# Patient Record
Sex: FEMALE | Race: WHITE | Employment: FULL TIME | URBAN - METROPOLITAN AREA
[De-identification: names, ages, dates, MRNs, and addresses within clinical notes are randomized per-mention and may not be internally consistent; named-entity substitution may affect disease eponyms.]

---

## 2020-07-01 ENCOUNTER — VIRTUAL VISIT (OUTPATIENT)
Dept: PEDIATRICS | Facility: CLINIC | Age: 40
End: 2020-07-01
Payer: COMMERCIAL

## 2020-07-01 DIAGNOSIS — F41.9 ANXIETY: Primary | ICD-10-CM

## 2020-07-01 PROCEDURE — 99203 OFFICE O/P NEW LOW 30 MIN: CPT | Mod: 95 | Performed by: INTERNAL MEDICINE

## 2020-07-01 PROCEDURE — 96127 BRIEF EMOTIONAL/BEHAV ASSMT: CPT | Performed by: INTERNAL MEDICINE

## 2020-07-01 RX ORDER — ESCITALOPRAM OXALATE 20 MG/1
20 TABLET ORAL DAILY
Qty: 30 TABLET | Refills: 1 | Status: SHIPPED | OUTPATIENT
Start: 2020-07-01 | End: 2020-08-24

## 2020-07-01 ASSESSMENT — ENCOUNTER SYMPTOMS
FATIGUE: 1
HEADACHES: 1
NERVOUS/ANXIOUS: 1
ABDOMINAL PAIN: 1
VERTIGO: 1

## 2020-07-01 ASSESSMENT — ANXIETY QUESTIONNAIRES
1. FEELING NERVOUS, ANXIOUS, OR ON EDGE: NEARLY EVERY DAY
6. BECOMING EASILY ANNOYED OR IRRITABLE: NEARLY EVERY DAY
7. FEELING AFRAID AS IF SOMETHING AWFUL MIGHT HAPPEN: SEVERAL DAYS
2. NOT BEING ABLE TO STOP OR CONTROL WORRYING: NEARLY EVERY DAY
GAD7 TOTAL SCORE: 16
5. BEING SO RESTLESS THAT IT IS HARD TO SIT STILL: NOT AT ALL
3. WORRYING TOO MUCH ABOUT DIFFERENT THINGS: NEARLY EVERY DAY
IF YOU CHECKED OFF ANY PROBLEMS ON THIS QUESTIONNAIRE, HOW DIFFICULT HAVE THESE PROBLEMS MADE IT FOR YOU TO DO YOUR WORK, TAKE CARE OF THINGS AT HOME, OR GET ALONG WITH OTHER PEOPLE: SOMEWHAT DIFFICULT

## 2020-07-01 ASSESSMENT — PATIENT HEALTH QUESTIONNAIRE - PHQ9
5. POOR APPETITE OR OVEREATING: NEARLY EVERY DAY
SUM OF ALL RESPONSES TO PHQ QUESTIONS 1-9: 16

## 2020-07-01 NOTE — PROGRESS NOTES
"Subjective Melissa Schwab is a 40 year old female who is being evaluated via a billable telephone visit.      The patient has been notified of following:     \"This telephone visit will be conducted via a call between you and your physician/provider. We have found that certain health care needs can be provided without the need for a physical exam.  This service lets us provide the care you need with a short phone conversation.  If a prescription is necessary we can send it directly to your pharmacy.  If lab work is needed we can place an order for that and you can then stop by our lab to have the test done at a later time.    Telephone visits are billed at different rates depending on your insurance coverage. During this emergency period, for some insurers they may be billed the same as an in-person visit.  Please reach out to your insurance provider with any questions.    If during the course of the call the physician/provider feels a telephone visit is not appropriate, you will not be charged for this service.\"    Patient has given verbal consent for Telephone visit?  Yes    What phone number would you like to be contacted at? [phone]    How would you like to obtain your AVS? Sabine    Subjective Melissa Schwab is a 40 year old female who presents via phone visit today for the following health issues:        Anxiety   This is a chronic problem. The current episode started more than 1 year ago (two years now). The problem occurs constantly. The problem has been gradually worsening. Associated symptoms include abdominal pain, fatigue, headaches and vertigo. Associated symptoms comments: Sleep or lack there  of. The symptoms are aggravated by drinking, exertion and stress. She has tried rest and relaxation (exercising) for the symptoms. The treatment provided no relief.      Was taking a medication to mitigate anxiety symptoms.  Rx ran out in January and stopped taking.    Anxiety is now worsening. "     Lexapro 20 mg per day was her dose.  No side effects were noted at 20 mg.    ALEXANDRA-7 SCORE 7/1/2020   Total Score 16     She would like to restart Lexapro  Discussed options as far as dosing      Review of Systems   Constitutional: Positive for fatigue.   Gastrointestinal: Positive for abdominal pain.   Neurological: Positive for vertigo and headaches.   Psychiatric/Behavioral: The patient is nervous/anxious.           Objective    There were no vitals taken for this visit.  There is no height or weight on file to calculate BMI.  Physical Exam   GEN: No distress  PSYCH: Alert, affect is normal  RESP: No cough, no audible wheezing, able to talk in full sentences  Remainder of exam unable to be completed due to telephone visits               Assessment/Plan:  1. Anxiety  Not currently controlled  - escitalopram (LEXAPRO) 20 MG tablet; Take 1 tablet (20 mg) by mouth daily  Dispense: 30 tablet; Refill: 1  Restart Lexapro  Rx for 20 mg tablets  Take 1/2 tablet daily for at least 1 week, can increase to full tablet if needed  Follow-up in 1-2 months for an update, sooner if sx worsen      Phone call duration:  6 minutes    Bobby Bermudez MD

## 2020-07-01 NOTE — PROGRESS NOTES
"Subjective     Melissa Schwab is a 40 year old female who presents to clinic today for the following health issues:    HPI   Depression and Anxiety Follow-Up    How are you doing with your depression since your last visit? { :637944::\"No change\"}    How are you doing with your anxiety since your last visit?  { :745860::\"No change\"}    Are you having other symptoms that might be associated with depression or anxiety? { :495115}    Have you had a significant life event? { :952835}     Do you have any concerns with your use of alcohol or other drugs? { :031925}    Social History     Tobacco Use     Smoking status: Not on file   Substance Use Topics     Alcohol use: Not on file     Drug use: Not on file     No flowsheet data found.  No flowsheet data found.  {Last PHQ9 or GAD7 Responses (Optional):989434}  {PROVIDER ONLY Complete follow-up questions for patients who report suicide ideation  (Optional):922762}    Suicide Assessment Five-step Evaluation and Treatment (SAFE-T)      How many servings of fruits and vegetables do you eat daily?  { :733353}    On average, how many sweetened beverages do you drink each day (Examples: soda, juice, sweet tea, etc.  Do NOT count diet or artificially sweetened beverages)?   { 1-11:894240}    How many days per week do you exercise enough to make your heart beat faster? { :088274}    How many minutes a day do you exercise enough to make your heart beat faster? { :733047}    How many days per week do you miss taking your medication? {0-7 :122468}    {additonal problems for provider to add (Optional):991920}    {HIST REVIEW/ LINKS 2 (Optional):468783}    {Additional problems for the provider to add (optional):688463}  Reviewed and updated as needed this visit by Provider         Review of Systems   {ROS COMP (Optional):455120}      Objective    There were no vitals taken for this visit.  There is no height or weight on file to calculate BMI.  Physical Exam   {Exam List " "(Optional):105500}    {Diagnostic Test Results (Optional):811753::\"Diagnostic Test Results:\",\"Labs reviewed in Epic\"}        {PROVIDER CHARTING PREFERENCE:726513}      "

## 2020-07-02 ASSESSMENT — ANXIETY QUESTIONNAIRES: GAD7 TOTAL SCORE: 16

## 2020-08-22 DIAGNOSIS — F41.9 ANXIETY: ICD-10-CM

## 2020-08-24 RX ORDER — ESCITALOPRAM OXALATE 20 MG/1
TABLET ORAL
Qty: 30 TABLET | Refills: 0 | Status: SHIPPED | OUTPATIENT
Start: 2020-08-24 | End: 2020-09-22

## 2020-08-24 NOTE — TELEPHONE ENCOUNTER
Prescription approved per [facility] Refill Protocol.    Angel Meneses RN

## 2020-10-06 ENCOUNTER — VIRTUAL VISIT (OUTPATIENT)
Dept: PEDIATRICS | Facility: CLINIC | Age: 40
End: 2020-10-06
Payer: COMMERCIAL

## 2020-10-06 DIAGNOSIS — F41.9 ANXIETY: ICD-10-CM

## 2020-10-06 PROCEDURE — 99213 OFFICE O/P EST LOW 20 MIN: CPT | Mod: 95 | Performed by: INTERNAL MEDICINE

## 2020-10-06 RX ORDER — ESCITALOPRAM OXALATE 20 MG/1
20 TABLET ORAL DAILY
Qty: 90 TABLET | Refills: 1 | Status: SHIPPED | OUTPATIENT
Start: 2020-10-06 | End: 2021-06-11

## 2020-10-06 ASSESSMENT — ANXIETY QUESTIONNAIRES
1. FEELING NERVOUS, ANXIOUS, OR ON EDGE: SEVERAL DAYS
2. NOT BEING ABLE TO STOP OR CONTROL WORRYING: SEVERAL DAYS
7. FEELING AFRAID AS IF SOMETHING AWFUL MIGHT HAPPEN: NOT AT ALL
6. BECOMING EASILY ANNOYED OR IRRITABLE: SEVERAL DAYS
5. BEING SO RESTLESS THAT IT IS HARD TO SIT STILL: SEVERAL DAYS
GAD7 TOTAL SCORE: 4
IF YOU CHECKED OFF ANY PROBLEMS ON THIS QUESTIONNAIRE, HOW DIFFICULT HAVE THESE PROBLEMS MADE IT FOR YOU TO DO YOUR WORK, TAKE CARE OF THINGS AT HOME, OR GET ALONG WITH OTHER PEOPLE: NOT DIFFICULT AT ALL
3. WORRYING TOO MUCH ABOUT DIFFERENT THINGS: NOT AT ALL

## 2020-10-06 ASSESSMENT — PATIENT HEALTH QUESTIONNAIRE - PHQ9: 5. POOR APPETITE OR OVEREATING: NOT AT ALL

## 2020-10-06 NOTE — PROGRESS NOTES
"Melissa Schwab is a 40 year old female who is being evaluated via a billable video visit.      The patient has been notified of following:     \"This video visit will be conducted via a call between you and your physician/provider. We have found that certain health care needs can be provided without the need for an in-person physical exam.  This service lets us provide the care you need with a video conversation.  If a prescription is necessary we can send it directly to your pharmacy.  If lab work is needed we can place an order for that and you can then stop by our lab to have the test done at a later time.    Video visits are billed at different rates depending on your insurance coverage.  Please reach out to your insurance provider with any questions.    If during the course of the call the physician/provider feels a video visit is not appropriate, you will not be charged for this service.\"    Patient has given verbal consent for Video visit? Yes  How would you like to obtain your AVS? MyChart  If you are dropped from the video visit, the video invite should be resent to: Text to cell phone: [phone]  Will anyone else be joining your video visit? No    Subjective     Melissa Schwab is a 40 year old female who presents today via video visit for the following health issues:    HPI    ALEXANDRA-7 SCORE 7/1/2020 10/6/2020   Total Score 16 4       Significant improvement w/ symptoms.  Feels the medication is working well.   No significant side effects noted.   Would like to CPM.     No other acute c/o today.       Video Start Time: 10:59 AM          Objective           Vitals:  No vitals were obtained today due to virtual visit.    Physical Exam     GEN: No distress  SKIN: No visible rashes  E: No eye drainage or icterus  ENT: No nasal discharge noted  LUNGS: No active cough, wheezing.   PSYCH: Normal affect. Well groomed.   NEURO: No focal deficits appreciated     Assessment & Plan       ICD-10-CM    1. Anxiety  F41.9 " escitalopram (LEXAPRO) 20 MG tablet     Sx are much better controlled w/ Lexapro.  CPM.          Return in about 6 months (around 4/6/2021) for Medication Recheck.    Bobby Bermudez MD  [facility]      Video-Visit Details    Type of service:  Video Visit    Video End Time:11:06 AM    Originating Location (pt. Location): Home    Distant Location (provider location):  [facility]     Platform used for Video Visit: Lois

## 2020-10-07 ASSESSMENT — ANXIETY QUESTIONNAIRES: GAD7 TOTAL SCORE: 4

## 2020-12-13 ENCOUNTER — HEALTH MAINTENANCE LETTER (OUTPATIENT)
Age: 40
End: 2020-12-13

## 2021-06-11 DIAGNOSIS — F41.9 ANXIETY: ICD-10-CM

## 2021-06-11 RX ORDER — ESCITALOPRAM OXALATE 20 MG/1
20 TABLET ORAL DAILY
Qty: 90 TABLET | Refills: 0 | Status: SHIPPED | OUTPATIENT
Start: 2021-06-11

## 2021-07-22 ENCOUNTER — HOSPITAL ENCOUNTER (OUTPATIENT)
Dept: CT IMAGING | Facility: CLINIC | Age: 41
Discharge: HOME | End: 2021-07-22
Attending: PHYSICIAN ASSISTANT | Admitting: PHYSICIAN ASSISTANT
Payer: COMMERCIAL

## 2021-07-22 ENCOUNTER — OFFICE VISIT (OUTPATIENT)
Dept: PEDIATRICS | Facility: CLINIC | Age: 41
End: 2021-07-22
Payer: COMMERCIAL

## 2021-07-22 ENCOUNTER — OFFICE VISIT (OUTPATIENT)
Dept: URGENT CARE | Facility: URGENT CARE | Age: 41
End: 2021-07-22
Payer: COMMERCIAL

## 2021-07-22 VITALS
WEIGHT: 156 LBS | DIASTOLIC BLOOD PRESSURE: 76 MMHG | TEMPERATURE: 97.9 F | SYSTOLIC BLOOD PRESSURE: 114 MMHG | HEART RATE: 75 BPM | BODY MASS INDEX: 26.99 KG/M2 | OXYGEN SATURATION: 99 % | RESPIRATION RATE: 18 BRPM

## 2021-07-22 VITALS
OXYGEN SATURATION: 98 % | DIASTOLIC BLOOD PRESSURE: 60 MMHG | HEART RATE: 78 BPM | RESPIRATION RATE: 20 BRPM | TEMPERATURE: 98 F | SYSTOLIC BLOOD PRESSURE: 107 MMHG

## 2021-07-22 DIAGNOSIS — R10.84 ABDOMINAL PAIN, GENERALIZED: Primary | ICD-10-CM

## 2021-07-22 DIAGNOSIS — R10.31 ABDOMINAL PAIN, RIGHT LOWER QUADRANT: Primary | ICD-10-CM

## 2021-07-22 DIAGNOSIS — R10.9 RIGHT SIDED ABDOMINAL PAIN: ICD-10-CM

## 2021-07-22 LAB
ALBUMIN SERPL-MCNC: 4.2 G/DL (ref 3.4–5)
ALBUMIN UR-MCNC: NEGATIVE MG/DL
ALP SERPL-CCNC: 43 U/L (ref 40–150)
ALT SERPL W P-5'-P-CCNC: 98 U/L (ref 0–50)
ANION GAP SERPL CALCULATED.3IONS-SCNC: 6 MMOL/L (ref 3–14)
APPEARANCE UR: CLEAR
AST SERPL W P-5'-P-CCNC: 69 U/L (ref 0–45)
BASOPHILS # BLD AUTO: 0 10E3/UL (ref 0–0.2)
BASOPHILS NFR BLD AUTO: 0 %
BILIRUB SERPL-MCNC: 1 MG/DL (ref 0.2–1.3)
BILIRUB UR QL STRIP: NEGATIVE
BUN SERPL-MCNC: 15 MG/DL (ref 7–30)
CALCIUM SERPL-MCNC: 9.2 MG/DL (ref 8.5–10.1)
CHLORIDE BLD-SCNC: 102 MMOL/L (ref 94–109)
CO2 SERPL-SCNC: 27 MMOL/L (ref 20–32)
COLOR UR AUTO: YELLOW
CREAT SERPL-MCNC: 0.65 MG/DL (ref 0.52–1.04)
EOSINOPHIL # BLD AUTO: 0 10E3/UL (ref 0–0.7)
EOSINOPHIL NFR BLD AUTO: 0 %
ERYTHROCYTE [DISTWIDTH] IN BLOOD BY AUTOMATED COUNT: 12.4 % (ref 10–15)
GFR SERPL CREATININE-BSD FRML MDRD: >90 ML/MIN/1.73M2
GLUCOSE BLD-MCNC: 92 MG/DL (ref 70–99)
GLUCOSE UR STRIP-MCNC: NEGATIVE MG/DL
HCG UR QL: NEGATIVE
HCT VFR BLD AUTO: 38.1 % (ref 35–47)
HGB BLD-MCNC: 12.6 G/DL (ref 11.7–15.7)
HGB UR QL STRIP: NEGATIVE
KETONES UR STRIP-MCNC: 40 MG/DL
LEUKOCYTE ESTERASE UR QL STRIP: NEGATIVE
LYMPHOCYTES # BLD AUTO: 0.9 10E3/UL (ref 0.8–5.3)
LYMPHOCYTES NFR BLD AUTO: 13 %
MCH RBC QN AUTO: 30.4 PG (ref 26.5–33)
MCHC RBC AUTO-ENTMCNC: 33.1 G/DL (ref 31.5–36.5)
MCV RBC AUTO: 92 FL (ref 78–100)
MONOCYTES # BLD AUTO: 0.3 10E3/UL (ref 0–1.3)
MONOCYTES NFR BLD AUTO: 4 %
NEUTROPHILS # BLD AUTO: 5.6 10E3/UL (ref 1.6–8.3)
NEUTROPHILS NFR BLD AUTO: 82 %
NITRATE UR QL: NEGATIVE
PH UR STRIP: 5.5 [PH] (ref 5–7)
PLATELET # BLD AUTO: 274 10E3/UL (ref 150–450)
POTASSIUM BLD-SCNC: 4 MMOL/L (ref 3.4–5.3)
PROT SERPL-MCNC: 7.5 G/DL (ref 6.8–8.8)
RBC # BLD AUTO: 4.14 10E6/UL (ref 3.8–5.2)
SODIUM SERPL-SCNC: 135 MMOL/L (ref 133–144)
SP GR UR STRIP: >=1.03 (ref 1–1.03)
UROBILINOGEN UR STRIP-ACNC: 0.2 E.U./DL
WBC # BLD AUTO: 6.8 10E3/UL (ref 4–11)

## 2021-07-22 PROCEDURE — 99417 PROLNG OP E/M EACH 15 MIN: CPT | Performed by: PHYSICIAN ASSISTANT

## 2021-07-22 PROCEDURE — 250N000011 HC RX IP 250 OP 636: Performed by: PHYSICIAN ASSISTANT

## 2021-07-22 PROCEDURE — 36415 COLL VENOUS BLD VENIPUNCTURE: CPT | Performed by: FAMILY MEDICINE

## 2021-07-22 PROCEDURE — 80053 COMPREHEN METABOLIC PANEL: CPT | Performed by: PHYSICIAN ASSISTANT

## 2021-07-22 PROCEDURE — 74177 CT ABD & PELVIS W/CONTRAST: CPT

## 2021-07-22 PROCEDURE — 81003 URINALYSIS AUTO W/O SCOPE: CPT | Performed by: FAMILY MEDICINE

## 2021-07-22 PROCEDURE — 250N000009 HC RX 250: Performed by: PHYSICIAN ASSISTANT

## 2021-07-22 PROCEDURE — 85025 COMPLETE CBC W/AUTO DIFF WBC: CPT | Performed by: FAMILY MEDICINE

## 2021-07-22 PROCEDURE — 81025 URINE PREGNANCY TEST: CPT | Performed by: FAMILY MEDICINE

## 2021-07-22 PROCEDURE — 99215 OFFICE O/P EST HI 40 MIN: CPT | Performed by: PHYSICIAN ASSISTANT

## 2021-07-22 PROCEDURE — 99207 REFERRAL TO ACUTE AND DIAGNOSTIC SERVICES: CPT | Performed by: FAMILY MEDICINE

## 2021-07-22 RX ORDER — DICYCLOMINE HCL 20 MG
20 TABLET ORAL EVERY 6 HOURS
Qty: 20 TABLET | Refills: 0 | Status: SHIPPED | OUTPATIENT
Start: 2021-07-22

## 2021-07-22 RX ORDER — NAPROXEN 500 MG/1
500 TABLET ORAL 2 TIMES DAILY WITH MEALS
Qty: 20 TABLET | Refills: 0 | Status: SHIPPED | OUTPATIENT
Start: 2021-07-22

## 2021-07-22 RX ORDER — IOPAMIDOL 755 MG/ML
500 INJECTION, SOLUTION INTRAVASCULAR ONCE
Status: COMPLETED | OUTPATIENT
Start: 2021-07-22 | End: 2021-07-22

## 2021-07-22 RX ADMIN — SODIUM CHLORIDE 60 ML: 9 INJECTION, SOLUTION INTRAVENOUS at 16:00

## 2021-07-22 RX ADMIN — IOPAMIDOL 81 ML: 755 INJECTION, SOLUTION INTRAVENOUS at 16:00

## 2021-07-22 NOTE — PROGRESS NOTES
Assessment & Plan     Abdominal pain, right lower quadrant  CBC neg for significant infection  CMP negative for renal or hepatic involvement  - IV access  - sodium chloride (PF) 0.9% PF flush 3 mL  - Comprehensive metabolic panel (BMP + Alb, Alk Phos, ALT, AST, Total. Bili, TP)  - CT Abdomen Pelvis w Contrast  - IV access  - naproxen (NAPROSYN) 500 MG tablet; Take 1 tablet (500 mg) by mouth 2 times daily (with meals)  - dicyclomine (BENTYL) 20 MG tablet; Take 1 tablet (20 mg) by mouth every 6 hours    Right sided abdominal pain  CT scan negative for appendicitis, diverticulitis, colitis, renal stone  - IV access  - sodium chloride (PF) 0.9% PF flush 3 mL  - Comprehensive metabolic panel (BMP + Alb, Alk Phos, ALT, AST, Total. Bili, TP)  - CT Abdomen Pelvis w Contrast  - IV access  - naproxen (NAPROSYN) 500 MG tablet; Take 1 tablet (500 mg) by mouth 2 times daily (with meals)  - dicyclomine (BENTYL) 20 MG tablet; Take 1 tablet (20 mg) by mouth every 6 hours    Review of external notes as documented elsewhere in note  > 90 minutes spent on the date of the encounter doing chart review, review of outside records, review of test results, interpretation of tests, patient visit and documentation     Bobby Adams PA-C  [facility] is a 41 year old who presents for the following health issues     HPI     Abdominal/Flank Pain  Onset/Duration: X 1 day  Description:   Character: Sharp  Location: right lower quadrant  Radiation: Back  Intensity: mild, severe  Progression of Symptoms:  worsening  Accompanying Signs & Symptoms:  Fever/Chills: YES- chills  Gas/Bloating: YES  Nausea: YES  Vomitting: no  Diarrhea: no  Constipation: no  Dysuria or Hematuria: no  History:   Trauma: no  Previous similar pain: no  Previous tests done: UA and Blood work previous today  Precipitating factors:   Does the pain change with:     Food: no    Bowel Movement: YES- relieved slightly this  morning, short term    Urination: no   Other factors:  no  Therapies tried and outcome: OTC Gas medication, tried eating banana, neither helped.  No LMP recorded. Patient has had a hysterectomy.          Review of Systems   Constitutional, HEENT, cardiovascular, pulmonary, GI, , musculoskeletal, neuro, skin, endocrine and psych systems are negative, except as otherwise noted.      Objective    Wt 70.8 kg (156 lb)   BMI 26.99 kg/m    Body mass index is 26.99 kg/m .  Physical Exam   GENERAL: healthy, alert and no distress  RESP: lungs clear to auscultation - no rales, rhonchi or wheezes  CV: regular rate and rhythm, normal S1 S2, no S3 or S4, no murmur, click or rub, no peripheral edema and peripheral pulses strong  ABDOMEN: Positive for mild to moderate right lower abdominal tenderness with movement and palpation  MS: no gross musculoskeletal defects noted, no edema  SKIN: no suspicious lesions or rashes  NEURO: Normal strength and tone, mentation intact and speech normal  PSYCH: mentation appears normal, affect normal/bright    Results for orders placed or performed in visit on 07/22/21   CT Abdomen Pelvis w Contrast     Status: None    Narrative    CT ABDOMEN AND PELVIS WITH CONTRAST 7/22/2021 4:13 PM    CLINICAL HISTORY: Right lower quadrant abdominal pain. Appendicitis  suspected (Age >= 14 years). Abdominal pain, right lower quadrant.  Right-sided abdominal pain.    TECHNIQUE: CT scan of the abdomen and pelvis was performed following  injection of IV contrast. Multiplanar reformats were obtained. Dose  reduction techniques were used.  CONTRAST: 81mL Isovue-370    COMPARISON: None.    FINDINGS:   LOWER CHEST: Normal.    HEPATOBILIARY: Normal.    PANCREAS: Normal.    SPLEEN: Normal.    ADRENAL GLANDS: Normal.    KIDNEYS/BLADDER: Kidneys and bladder are unremarkable. No urinary  tract calculi or hydronephrosis.    BOWEL: No bowel obstruction, diverticulitis or appendicitis.    LYMPH NODES: No enlarged abdominal  or pelvic lymph nodes.    VASCULATURE: Unremarkable.    PELVIC ORGANS: Uterus not identified. Probable left ovarian cysts  measuring 1.6 and 1.4 cm on series 4, image 157. Right ovary is  unremarkable. No pelvic free fluid.    ADDITIONAL FINDINGS: None.    MUSCULOSKELETAL: Normal.      Impression    IMPRESSION:   1.  No acute changes in the abdomen or pelvis to account for patient's  symptoms. No bowel obstruction, diverticulitis or appendicitis. No  urinary tract calculi or hydronephrosis.    2.  Left ovarian cysts measuring up to 1.6 cm. These are likely  physiologic. Right ovary is unremarkable. No free pelvic fluid.    MATTY ROSALES MD         SYSTEM ID:  [id number]   Comprehensive metabolic panel (BMP + Alb, Alk Phos, ALT, AST, Total. Bili, TP)     Status: Abnormal   Result Value Ref Range    Sodium 135 133 - 144 mmol/L    Potassium 4.0 3.4 - 5.3 mmol/L    Chloride 102 94 - 109 mmol/L    Carbon Dioxide (CO2) 27 20 - 32 mmol/L    Anion Gap 6 3 - 14 mmol/L    Urea Nitrogen 15 7 - 30 mg/dL    Creatinine 0.65 0.52 - 1.04 mg/dL    Calcium 9.2 8.5 - 10.1 mg/dL    Glucose 92 70 - 99 mg/dL    Alkaline Phosphatase 43 40 - 150 U/L    AST 69 (H) 0 - 45 U/L    ALT 98 (H) 0 - 50 U/L    Protein Total 7.5 6.8 - 8.8 g/dL    Albumin 4.2 3.4 - 5.0 g/dL    Bilirubin Total 1.0 0.2 - 1.3 mg/dL    GFR Estimate >90 >60 mL/min/1.73m2   Results for orders placed or performed in visit on 07/22/21   UA Macro with Reflex to Micro and Culture - lab collect     Status: Abnormal    Specimen: Urine, Midstream   Result Value Ref Range    Color Urine Yellow Colorless, Straw, Light Yellow, Yellow    Appearance Urine Clear Clear    Glucose Urine Negative Negative mg/dL    Bilirubin Urine Negative Negative    Ketones Urine 40  (A) Negative mg/dL    Specific Gravity Urine >=1.030 1.003 - 1.035    Blood Urine Negative Negative    pH Urine 5.5 5.0 - 7.0    Protein Albumin Urine Negative Negative mg/dL    Urobilinogen Urine 0.2 0.2, 1.0 E.U./dL    Nitrite  Urine Negative Negative    Leukocyte Esterase Urine Negative Negative    Narrative    Microscopic not indicated   HCG Qual, Urine ([id number])     Status: Normal   Result Value Ref Range    hCG Urine Qualitative Negative Negative   CBC with platelets and differential     Status: None   Result Value Ref Range    WBC Count 6.8 4.0 - 11.0 10e3/uL    RBC Count 4.14 3.80 - 5.20 10e6/uL    Hemoglobin 12.6 11.7 - 15.7 g/dL    Hematocrit 38.1 35.0 - 47.0 %    MCV 92 78 - 100 fL    MCH 30.4 26.5 - 33.0 pg    MCHC 33.1 31.5 - 36.5 g/dL    RDW 12.4 10.0 - 15.0 %    Platelet Count 274 150 - 450 10e3/uL    % Neutrophils 82 %    % Lymphocytes 13 %    % Monocytes 4 %    % Eosinophils 0 %    % Basophils 0 %    Absolute Neutrophils 5.6 1.6 - 8.3 10e3/uL    Absolute Lymphocytes 0.9 0.8 - 5.3 10e3/uL    Absolute Monocytes 0.3 0.0 - 1.3 10e3/uL    Absolute Eosinophils 0.0 0.0 - 0.7 10e3/uL    Absolute Basophils 0.0 0.0 - 0.2 10e3/uL   CBC with platelets and differential     Status: None    Narrative    The following orders were created for panel order CBC with platelets and differential.  Procedure                               Abnormality         Status                     ---------                               -----------         ------                     CBC with platelets and d...[[id number]]                      Final result                 Please view results for these tests on the individual orders.

## 2021-07-22 NOTE — PATIENT INSTRUCTIONS
Patient Education     Unknown Causes of Abdominal Pain (Female)    The exact cause of your belly (abdominal) pain is not clear. This does not mean that this is something to worry about. Everyone likes to know the exact cause of the problem. But sometimes with belly pain, there is no clear-cut cause, and this could be a good thing. The good news is that your symptoms can be treated, and you will feel better.   Your condition does not seem serious now. But sometimes the signs of a serious problem may take more time to appear. For this reason, it is important for you to watch for any new symptoms, problems, or worsening of your condition.  Over the next few days, the abdominal pain may come and go. Or it may be constant. Other common symptoms can include nausea and vomiting. Sometimes it can be difficult to tell if you feel nauseous. You may just feel bad and not connect that feeling to nausea. Constipation, diarrhea, and a fever may go along with the pain.  The pain may continue even if treated correctly over the following days. Depending on how things go, sometimes the cause can become clear and may need more or different treatment. Additional evaluations, medicines, or tests may also be needed.  Home care  Your healthcare provider may prescribe medicine for pain, symptoms, or an infection.  Follow the healthcare provider's instructions for taking these medicines.  General care    Rest as much as you can until your next exam. No strenuous activities.    Try to find positions that ease discomfort. A small pillow placed on the abdomen may help relieve pain.    Something warm on your abdomen (such as a heating pad) may help, but be careful not to burn yourself.  Diet    Don t force yourself to eat, especially if having cramps, vomiting, or diarrhea.    Water is important so you don't get dehydrated. Soup may also be good. Sports drinks may also help, especially if they are not too acidic. Don't drink sugary drinks as  this can make things worse. Take liquids in small amounts. Don t guzzle them.    Caffeine sometimes makes the pain and cramping worse.    Don t take dairy products if you have vomiting or diarrhea.    Don't eat large amounts at a time. Wait a few minutes between bites.    Eat a diet low in fiber (called a low-residue diet). Foods allowed include refined breads, white rice, fruit and vegetable juices without pulp, tender meats. These foods will pass more easily through the intestine.    Don t have whole-grain foods, whole fruits and vegetables, meats, seeds and nuts, fried or fatty foods, dairy, alcohol and spicy foods until your symptoms go away.  Follow-up care  Follow up with your healthcare provider, or as advised, if your pain does not begin to improve in the next 24 hours.  Call 911  Call 911 if any of these occur:    Trouble breathing    Confusion    Fainting or loss of consciousness    Rapid heart rate    Seizure  When to seek medical advice  Call your healthcare provider right away if any of these occur:    Pain gets worse or moves to the right lower abdomen    New or worsening vomiting or diarrhea    Swelling of the abdomen    Unable to pass stool for more than 3 days    Fever of 100.4 F (38 C) or higher, or as directed by your healthcare provider.    Blood in vomit or bowel movements (dark red or black color)    Yellow color of eyes and skin (jaundice)    Weakness, dizziness    Chest, arm, back, neck, or jaw pain    Unexpected vaginal bleeding or missed period    Can't keep down liquids or water and you are getting dehydrated  [email or web address] last reviewed this educational content on 6/1/2018 2000-2021 [employer]. All rights reserved. This information is not intended as a substitute for professional medical care. Always follow your healthcare professional's instructions.

## 2021-07-22 NOTE — PROGRESS NOTES
SUBJECTIVE  HPI:  Melissa L Schwab is a 41 year old female who presents with the CC of abdominal pain.    Developed right sided abdominal pain this morning.  Tried to work and discomfort did subside for a little but returned.  Developed hot and cold feelings.  Denies any dysuria.  Last BM this morning, slightly harder but normal.      Had hysterectomy.  Still has appendix.    No past medical history on file.  Current Outpatient Medications   Medication Sig Dispense Refill     escitalopram (LEXAPRO) 20 MG tablet Take 1 tablet (20 mg) by mouth daily Visit needed prior to additional refills. 90 tablet 0     Social History     Tobacco Use     Smoking status: Never Smoker     Smokeless tobacco: Never Used   Substance Use Topics     Alcohol use: Not on file       ROS:  Review of systems negative except as stated above.    OBJECTIVE:  /60   Pulse 78   Temp 98  F (36.7  C) (Tympanic)   Resp 20   SpO2 98%   Breastfeeding No   GENERAL APPEARANCE: healthy, alert and no distress  EYES: EOMI,  PERRL, conjunctiva clear  RESP: lungs clear to auscultation - no rales, rhonchi or wheezes  CV: regular rates and rhythm, normal S1 S2, no murmur noted  ABDOMEN:  soft, mild tenderness in RLQ, no guarding, equivocal rebound, bowel sounds normal  BACK: no flank tenderness  PSYCH: mentation appears normal and affect normal/bright    Results for orders placed or performed in visit on 07/22/21   UA Macro with Reflex to Micro and Culture - lab collect     Status: Abnormal    Specimen: Urine, Midstream   Result Value Ref Range    Color Urine Yellow Colorless, Straw, Light Yellow, Yellow    Appearance Urine Clear Clear    Glucose Urine Negative Negative mg/dL    Bilirubin Urine Negative Negative    Ketones Urine 40  (A) Negative mg/dL    Specific Gravity Urine >=1.030 1.003 - 1.035    Blood Urine Negative Negative    pH Urine 5.5 5.0 - 7.0    Protein Albumin Urine Negative Negative mg/dL    Urobilinogen Urine 0.2 0.2, 1.0 E.U./dL     Nitrite Urine Negative Negative    Leukocyte Esterase Urine Negative Negative    Narrative    Microscopic not indicated   HCG Qual, Urine ([id number])     Status: Normal   Result Value Ref Range    hCG Urine Qualitative Negative Negative   CBC with platelets and differential     Status: None   Result Value Ref Range    WBC Count 6.8 4.0 - 11.0 10e3/uL    RBC Count 4.14 3.80 - 5.20 10e6/uL    Hemoglobin 12.6 11.7 - 15.7 g/dL    Hematocrit 38.1 35.0 - 47.0 %    MCV 92 78 - 100 fL    MCH 30.4 26.5 - 33.0 pg    MCHC 33.1 31.5 - 36.5 g/dL    RDW 12.4 10.0 - 15.0 %    Platelet Count 274 150 - 450 10e3/uL    % Neutrophils 82 %    % Lymphocytes 13 %    % Monocytes 4 %    % Eosinophils 0 %    % Basophils 0 %    Absolute Neutrophils 5.6 1.6 - 8.3 10e3/uL    Absolute Lymphocytes 0.9 0.8 - 5.3 10e3/uL    Absolute Monocytes 0.3 0.0 - 1.3 10e3/uL    Absolute Eosinophils 0.0 0.0 - 0.7 10e3/uL    Absolute Basophils 0.0 0.0 - 0.2 10e3/uL   CBC with platelets and differential     Status: None    Narrative    The following orders were created for panel order CBC with platelets and differential.  Procedure                               Abnormality         Status                     ---------                               -----------         ------                     CBC with platelets and d...[[id number]]                      Final result                 Please view results for these tests on the individual orders.       ASSESSMENT:  (R10.84) Abdominal pain, generalized  (primary encounter diagnosis)  Comment: right LQ  Plan: CBC with platelets and differential, UA Macro         with Reflex to Micro and Culture - lab collect,        HCG Qual, Urine ([id number])            Abdominal pain - RLQ, concerning for possible appendicitis vs ovarian etiology.  Will need further imaging.  Refer to [facility].      Transfer to Acute and Diagnostic Services  I have contacted the staff at the [facility] at  [phone] ([place]) to confirm patient acceptance.  Special Needs: None    Discussed transition to [facility] Diagnostic Services Clinic, and patient agrees with next level of care.  Patient transportation will be provided by the patient.    Reji Petty MD  July 22, 2021 2:56 PM

## 2021-09-09 DIAGNOSIS — F41.9 ANXIETY: ICD-10-CM

## 2021-09-10 NOTE — TELEPHONE ENCOUNTER
Routing refill request to provider for review/approval because:  Drug interaction warning  Brit Spears RN, BSN

## 2021-09-13 RX ORDER — ESCITALOPRAM OXALATE 20 MG/1
20 TABLET ORAL DAILY
Qty: 90 TABLET | Refills: 0 | OUTPATIENT
Start: 2021-09-13

## 2021-09-13 NOTE — TELEPHONE ENCOUNTER
Called patient. She does not need a refill of the Lexapro at this time because she has some left. For the last 3 weeks she has been cutting her dose in half because she is trying to wean off of it. She said as of now things are going alright. She was at work and did not wish to schedule a follow up visit but she will call us back later to schedule an appointment.

## 2021-09-26 ENCOUNTER — HEALTH MAINTENANCE LETTER (OUTPATIENT)
Age: 41
End: 2021-09-26

## 2022-01-16 ENCOUNTER — HEALTH MAINTENANCE LETTER (OUTPATIENT)
Age: 42
End: 2022-01-16

## 2023-01-14 ENCOUNTER — HEALTH MAINTENANCE LETTER (OUTPATIENT)
Age: 43
End: 2023-01-14

## 2023-04-23 ENCOUNTER — HEALTH MAINTENANCE LETTER (OUTPATIENT)
Age: 43
End: 2023-04-23

## 2024-02-11 ENCOUNTER — HEALTH MAINTENANCE LETTER (OUTPATIENT)
Age: 44
End: 2024-02-11

## 2024-06-30 ENCOUNTER — HEALTH MAINTENANCE LETTER (OUTPATIENT)
Age: 44
End: 2024-06-30